# Patient Record
Sex: MALE | URBAN - METROPOLITAN AREA
[De-identification: names, ages, dates, MRNs, and addresses within clinical notes are randomized per-mention and may not be internally consistent; named-entity substitution may affect disease eponyms.]

---

## 2024-04-29 ENCOUNTER — OV NP (OUTPATIENT)
Dept: URBAN - METROPOLITAN AREA CLINIC 37 | Facility: CLINIC | Age: 69
End: 2024-04-29

## 2024-05-03 ENCOUNTER — OFFICE VISIT (OUTPATIENT)
Dept: URBAN - METROPOLITAN AREA CLINIC 37 | Facility: CLINIC | Age: 69
End: 2024-05-03

## 2024-05-08 ENCOUNTER — OFFICE VISIT (OUTPATIENT)
Dept: URBAN - METROPOLITAN AREA CLINIC 35 | Facility: CLINIC | Age: 69
End: 2024-05-08

## 2024-05-16 ENCOUNTER — DASHBOARD ENCOUNTERS (OUTPATIENT)
Age: 69
End: 2024-05-16

## 2024-05-16 ENCOUNTER — LAB OUTSIDE AN ENCOUNTER (OUTPATIENT)
Dept: URBAN - METROPOLITAN AREA CLINIC 35 | Facility: CLINIC | Age: 69
End: 2024-05-16

## 2024-05-16 ENCOUNTER — OFFICE VISIT (OUTPATIENT)
Dept: URBAN - METROPOLITAN AREA CLINIC 35 | Facility: CLINIC | Age: 69
End: 2024-05-16

## 2024-05-16 ENCOUNTER — OFFICE VISIT (OUTPATIENT)
Dept: URBAN - METROPOLITAN AREA CLINIC 35 | Facility: CLINIC | Age: 69
End: 2024-05-16
Payer: COMMERCIAL

## 2024-05-16 ENCOUNTER — TELEPHONE ENCOUNTER (OUTPATIENT)
Dept: URBAN - METROPOLITAN AREA CLINIC 35 | Facility: CLINIC | Age: 69
End: 2024-05-16

## 2024-05-16 VITALS
HEIGHT: 64 IN | HEART RATE: 85 BPM | SYSTOLIC BLOOD PRESSURE: 110 MMHG | BODY MASS INDEX: 28.68 KG/M2 | WEIGHT: 168 LBS | OXYGEN SATURATION: 99 % | DIASTOLIC BLOOD PRESSURE: 65 MMHG

## 2024-05-16 DIAGNOSIS — A09 DIARRHEA OF INFECTIOUS ORIGIN: ICD-10-CM

## 2024-05-16 DIAGNOSIS — K59.09 CHRONIC CONSTIPATION: ICD-10-CM

## 2024-05-16 PROCEDURE — 99203 OFFICE O/P NEW LOW 30 MIN: CPT | Performed by: INTERNAL MEDICINE

## 2024-05-16 PROCEDURE — 99243 OFF/OP CNSLTJ NEW/EST LOW 30: CPT | Performed by: INTERNAL MEDICINE

## 2024-05-16 RX ORDER — LEVOTHYROXINE SODIUM 150 UG/1
1 TABLET IN THE MORNING ON AN EMPTY STOMACH TABLET ORAL ONCE A DAY
Status: ACTIVE | COMMUNITY

## 2024-05-16 RX ORDER — ENALAPRIL MALEATE 10 MG/1
1 TABLET TABLET ORAL ONCE A DAY
Status: ACTIVE | COMMUNITY

## 2024-05-26 ENCOUNTER — TELEPHONE ENCOUNTER (OUTPATIENT)
Dept: URBAN - METROPOLITAN AREA CLINIC 35 | Facility: CLINIC | Age: 69
End: 2024-05-26

## 2024-07-18 ENCOUNTER — OFFICE VISIT (OUTPATIENT)
Dept: URBAN - METROPOLITAN AREA CLINIC 35 | Facility: CLINIC | Age: 69
End: 2024-07-18

## 2024-07-18 NOTE — HPI-TODAY'S VISIT:
69 year old male patient with previous history of Diarrhea of infectious origin, and chronic constipation presents today for his 2 month follow-up.     Last appointment  Abdomen X-ray 2 views

## 2024-09-03 ENCOUNTER — OFFICE VISIT (OUTPATIENT)
Dept: URBAN - METROPOLITAN AREA CLINIC 78 | Facility: CLINIC | Age: 69
End: 2024-09-03

## 2024-09-03 RX ORDER — ENALAPRIL MALEATE 10 MG/1
1 TABLET TABLET ORAL ONCE A DAY
Status: ACTIVE | COMMUNITY

## 2024-09-03 RX ORDER — LEVOTHYROXINE SODIUM 150 UG/1
1 TABLET IN THE MORNING ON AN EMPTY STOMACH TABLET ORAL ONCE A DAY
Status: ACTIVE | COMMUNITY

## 2024-09-23 ENCOUNTER — OFFICE VISIT (OUTPATIENT)
Dept: URBAN - METROPOLITAN AREA CLINIC 78 | Facility: CLINIC | Age: 69
End: 2024-09-23
Payer: COMMERCIAL

## 2024-09-23 VITALS
DIASTOLIC BLOOD PRESSURE: 79 MMHG | BODY MASS INDEX: 28.85 KG/M2 | TEMPERATURE: 98 F | HEIGHT: 64 IN | SYSTOLIC BLOOD PRESSURE: 120 MMHG | WEIGHT: 169 LBS | HEART RATE: 94 BPM

## 2024-09-23 DIAGNOSIS — R15.2 FECAL URGENCY: ICD-10-CM

## 2024-09-23 DIAGNOSIS — R13.19 ESOPHAGEAL DYSPHAGIA: ICD-10-CM

## 2024-09-23 PROCEDURE — 99214 OFFICE O/P EST MOD 30 MIN: CPT

## 2024-09-23 RX ORDER — LEVOTHYROXINE SODIUM 150 UG/1
1 TABLET IN THE MORNING ON AN EMPTY STOMACH TABLET ORAL ONCE A DAY
Status: ACTIVE | COMMUNITY

## 2024-09-23 RX ORDER — ENALAPRIL MALEATE 10 MG/1
1 TABLET TABLET ORAL ONCE A DAY
Status: ACTIVE | COMMUNITY

## 2024-09-23 RX ORDER — OMEPRAZOLE 40 MG/1
1 CAPSULE 30 MINUTES BEFORE MORNING MEAL CAPSULE, DELAYED RELEASE ORAL ONCE A DAY
Qty: 90 | Refills: 3 | OUTPATIENT
Start: 2024-09-23

## 2024-09-23 NOTE — HPI-TODAY'S VISIT:
69-year-old male, established patient, last seen in May 2024 by Dr. Durbin for diarrhea and chronic constipation.  Patient was scheduled to complete an x-ray of his abdomen to assess for overflow symptoms.     At this appointment patient complained of diarrhea for the past 6 months associated with generalized abdominal pain and 15 pounds of on intentional weight loss.    He completed stool studies with his primary care provider and was found to have parasites.  He was prescribed antiparasitic medication with improvement of symptoms and reported at this appointment that he was having 0-1 BMs per day with solid consistency and small-volume BRBPR.  Imaging study was not completed and patient was lost to follow-up at the time.   ~~ He presents  with his wife today for evaluation of darker colored stool for the past week, but this has not occured over the past 2 days, he reports that he is now constipated. -- The diarrhea  started around the end of the week of Sept 9th, he was eating an increased amount of chocolate at the time, and believes that this causes his diarrhea.  This occurs everytime he eats this type of chocolate. -- He would have 3 episodes of diarrhea per day, consisting of softer stools. -- He reports black colored stools, but was taking pepto bismol at the time to help with the diarrhea. -- He has not had any diarrhea since Monday, Sept 16th , but reports that he is now constipated. He has been having BM since then, that are normal in color, he does see blood in the TP due to hemorhoids. He says that his stool are harder and softer now.   --He has BM once a day now, or can go a few days without a BM. --He does have associated abdominal pain, localized in his lower abdomen, this is intermittent, more defined as urgency with cramping, this resolves after having a BM. -- He had a colonoscopy last year, had polyps removed, told to repeat in 5 year, He does not remember who performed this. --He denies family hx of GI ca or dz.  He denies N/V/ GERD.  He does have esophageal dysphagia, but only when he eats "Dry food." This occurs with foods like plantains. This is very rare.  He drinks water for relief of this.

## 2024-11-04 ENCOUNTER — OFFICE VISIT (OUTPATIENT)
Dept: URBAN - METROPOLITAN AREA CLINIC 78 | Facility: CLINIC | Age: 69
End: 2024-11-04
Payer: COMMERCIAL

## 2024-11-04 VITALS
DIASTOLIC BLOOD PRESSURE: 82 MMHG | HEIGHT: 64 IN | WEIGHT: 170.2 LBS | BODY MASS INDEX: 29.06 KG/M2 | TEMPERATURE: 98 F | HEART RATE: 101 BPM | SYSTOLIC BLOOD PRESSURE: 118 MMHG | RESPIRATION RATE: 16 BRPM

## 2024-11-04 DIAGNOSIS — Z12.11 SCREENING FOR COLON CANCER: ICD-10-CM

## 2024-11-04 DIAGNOSIS — R15.2 FECAL URGENCY: ICD-10-CM

## 2024-11-04 DIAGNOSIS — R13.19 ESOPHAGEAL DYSPHAGIA: ICD-10-CM

## 2024-11-04 DIAGNOSIS — R19.4 CHANGE IN BOWEL HABITS: ICD-10-CM

## 2024-11-04 PROCEDURE — 99214 OFFICE O/P EST MOD 30 MIN: CPT

## 2024-11-04 RX ORDER — OMEPRAZOLE 40 MG/1
1 CAPSULE 30 MINUTES BEFORE MORNING MEAL CAPSULE, DELAYED RELEASE ORAL ONCE A DAY
Qty: 90 | Refills: 3 | Status: ACTIVE | COMMUNITY
Start: 2024-09-23

## 2024-11-04 RX ORDER — LEVOTHYROXINE SODIUM 150 UG/1
1 TABLET IN THE MORNING ON AN EMPTY STOMACH TABLET ORAL ONCE A DAY
Status: ACTIVE | COMMUNITY

## 2024-11-04 RX ORDER — OMEPRAZOLE 40 MG/1
1 CAPSULE 30 MINUTES BEFORE MORNING MEAL CAPSULE, DELAYED RELEASE ORAL ONCE A DAY
Qty: 90 | Refills: 3 | OUTPATIENT

## 2024-11-04 RX ORDER — ENALAPRIL MALEATE 10 MG/1
1 TABLET TABLET ORAL ONCE A DAY
Status: ACTIVE | COMMUNITY

## 2024-11-04 NOTE — HPI-TODAY'S VISIT:
69-year-old male, established patient, last seen by me in September 2030 2024 for a change in bowel habits consisting of diarrhea associated with fecal urgency which he attributed to increased chocolate consumption, and rare episodes of esophageal dysphagia.   He was started on omeprazole 40 mg daily, advised to start a daily fiber supplement, switch brands of chocolate, and orders for CBC, CMP, food allergy profile, lactose tolerance test, and celiac disease panel were placed. Pt did not complete the labs. He was unable to get the BW completed.   He presents today for f/u. He has not had diarrhea since our last appointment. He has a BM QD, no blood or mucus in the stool. He is taking the Omeprazole as rx. He denies N/V/GERD or dysphagia. He reports that he has been feeling a bit depressed, but he takes medicaitons for this. He denies abn weight loss or abd pain.

## 2024-11-11 ENCOUNTER — OFFICE VISIT (OUTPATIENT)
Dept: URBAN - METROPOLITAN AREA CLINIC 78 | Facility: CLINIC | Age: 69
End: 2024-11-11